# Patient Record
Sex: FEMALE | Race: OTHER | HISPANIC OR LATINO
[De-identification: names, ages, dates, MRNs, and addresses within clinical notes are randomized per-mention and may not be internally consistent; named-entity substitution may affect disease eponyms.]

---

## 2023-04-14 PROBLEM — Z00.00 ENCOUNTER FOR PREVENTIVE HEALTH EXAMINATION: Status: ACTIVE | Noted: 2023-04-14

## 2023-04-21 ENCOUNTER — APPOINTMENT (OUTPATIENT)
Dept: NEUROLOGY | Facility: CLINIC | Age: 58
End: 2023-04-21
Payer: MEDICARE

## 2023-04-21 ENCOUNTER — LABORATORY RESULT (OUTPATIENT)
Age: 58
End: 2023-04-21

## 2023-04-21 VITALS
HEART RATE: 81 BPM | HEIGHT: 64 IN | OXYGEN SATURATION: 99 % | WEIGHT: 156 LBS | SYSTOLIC BLOOD PRESSURE: 105 MMHG | BODY MASS INDEX: 26.63 KG/M2 | DIASTOLIC BLOOD PRESSURE: 62 MMHG | TEMPERATURE: 98.3 F

## 2023-04-21 DIAGNOSIS — Z76.0 ENCOUNTER FOR ISSUE OF REPEAT PRESCRIPTION: ICD-10-CM

## 2023-04-21 DIAGNOSIS — Z86.69 PERSONAL HISTORY OF OTHER DISEASES OF THE NERVOUS SYSTEM AND SENSE ORGANS: ICD-10-CM

## 2023-04-21 DIAGNOSIS — M25.552 PAIN IN RIGHT HIP: ICD-10-CM

## 2023-04-21 DIAGNOSIS — Z86.79 PERSONAL HISTORY OF OTHER DISEASES OF THE CIRCULATORY SYSTEM: ICD-10-CM

## 2023-04-21 DIAGNOSIS — M25.551 PAIN IN RIGHT HIP: ICD-10-CM

## 2023-04-21 DIAGNOSIS — M25.559 PAIN IN UNSPECIFIED HIP: ICD-10-CM

## 2023-04-21 DIAGNOSIS — Z80.9 FAMILY HISTORY OF MALIGNANT NEOPLASM, UNSPECIFIED: ICD-10-CM

## 2023-04-21 DIAGNOSIS — Z86.39 PERSONAL HISTORY OF OTHER ENDOCRINE, NUTRITIONAL AND METABOLIC DISEASE: ICD-10-CM

## 2023-04-21 DIAGNOSIS — Z78.9 OTHER SPECIFIED HEALTH STATUS: ICD-10-CM

## 2023-04-21 PROCEDURE — 99205 OFFICE O/P NEW HI 60 MIN: CPT

## 2023-04-21 RX ORDER — METOPROLOL SUCCINATE 25 MG/1
25 TABLET, EXTENDED RELEASE ORAL
Refills: 0 | Status: ACTIVE | COMMUNITY

## 2023-04-21 RX ORDER — ATORVASTATIN CALCIUM 80 MG/1
80 TABLET, FILM COATED ORAL
Refills: 0 | Status: ACTIVE | COMMUNITY

## 2023-04-21 RX ORDER — ESCITALOPRAM OXALATE 10 MG/1
10 TABLET, FILM COATED ORAL
Refills: 0 | Status: ACTIVE | COMMUNITY

## 2023-04-21 RX ORDER — CRANBERRY CONC/C/BACILL COAG 250-30-15
TABLET ORAL
Refills: 0 | Status: ACTIVE | COMMUNITY

## 2023-04-21 RX ORDER — CYCLOBENZAPRINE HYDROCHLORIDE 7.5 MG/1
TABLET, FILM COATED ORAL
Refills: 0 | Status: ACTIVE | COMMUNITY

## 2023-04-21 RX ORDER — ESCITALOPRAM OXALATE 20 MG/1
TABLET ORAL
Refills: 0 | Status: ACTIVE | COMMUNITY

## 2023-04-21 RX ORDER — ESOMEPRAZOLE MAGNESIUM 40 MG/1
40 CAPSULE, DELAYED RELEASE ORAL
Refills: 0 | Status: ACTIVE | COMMUNITY

## 2023-04-21 RX ORDER — ASPIRIN 81 MG/1
81 TABLET ORAL
Refills: 0 | Status: ACTIVE | COMMUNITY

## 2023-04-21 RX ORDER — METFORMIN HYDROCHLORIDE 500 MG/1
500 TABLET, COATED ORAL
Refills: 0 | Status: ACTIVE | COMMUNITY

## 2023-04-21 NOTE — ASSESSMENT
[FreeTextEntry1] : Memory problems and headaches in patient with FEROZ and severe anxiety both can be causal factors, will get reversible causes of dementia and MRI brain to eval for treatable causes, advised patient to fu with sleep specialist and psychiatrist.\par \par I spent the time noted on the day of this patient encounter preparing for, providing and documenting the above E/M service and counseling and educate patient on differential, workup, disease course, and treatment/management. Education was provided to the patient during this encounter. All questions and concerns were answered and addressed in detail. The patient verbalized understanding and agreed to plan. Patient was advised to continue to monitor for neurologic symptoms and to notify my office or go to the nearest emergency room if there are any changes. Any orders/referrals and communications were provided as well. \par Side effects of the above medications were discussed in detail including but not limited to applicable black box warning and teratogenicity as appropriate. \par Patient was advised to bring previous records to my office, including CD of imaging, when applicable. \par \par

## 2023-04-21 NOTE — CONSULT LETTER
[Dear  ___] : Dear  [unfilled], [Consult Letter:] : I had the pleasure of evaluating your patient, [unfilled]. [Please see my note below.] : Please see my note below. [Consult Closing:] : Thank you very much for allowing me to participate in the care of this patient.  If you have any questions, please do not hesitate to contact me. [Sincerely,] : Sincerely, [FreeTextEntry3] : Marcie Alvarez MD, MPH\par

## 2023-04-21 NOTE — PHYSICAL EXAM
[Person] : oriented to person [Place] : oriented to place [Time] : oriented to time [Concentration Intact] : normal concentrating ability [Naming Objects] : no difficulty naming common objects [Fluency] : fluency intact [Comprehension] : comprehension intact [Vocabulary] : adequate range of vocabulary [Cranial Nerves Optic (II)] : visual acuity intact bilaterally,  visual fields full to confrontation, pupils equal round and reactive to light [Cranial Nerves Oculomotor (III)] : extraocular motion intact [Cranial Nerves Trigeminal (V)] : facial sensation intact symmetrically [Cranial Nerves Facial (VII)] : face symmetrical [Cranial Nerves Vestibulocochlear (VIII)] : hearing was intact bilaterally [Cranial Nerves Glossopharyngeal (IX)] : tongue and palate midline [Cranial Nerves Accessory (XI - Cranial And Spinal)] : head turning and shoulder shrug symmetric [Cranial Nerves Hypoglossal (XII)] : there was no tongue deviation with protrusion [Motor Tone] : muscle tone was normal in all four extremities [Motor Strength] : muscle strength was normal in all four extremities [Involuntary Movements] : no involuntary movements were seen [Sensation Tactile Decrease] : light touch was intact [Abnormal Walk] : normal gait [Balance] : balance was intact [Coordination - Dysmetria Impaired Finger-to-Nose Bilateral] : not present [Coordination - Dysmetria Impaired Heel-to-Shin Bilateral] : not present

## 2023-05-09 LAB
FOLATE SERPL-MCNC: 9.9 NG/ML
METHYLMALONATE SERPL-SCNC: 108 NMOL/L
T PALLIDUM AB SER QL IA: POSITIVE
T3 SERPL-MCNC: 115 NG/DL
T4 SERPL-MCNC: 6.6 UG/DL
TSH SERPL-ACNC: 0.12 UIU/ML
VIT B12 SERPL-MCNC: 554 PG/ML

## 2023-07-21 ENCOUNTER — APPOINTMENT (OUTPATIENT)
Dept: NEUROLOGY | Facility: CLINIC | Age: 58
End: 2023-07-21
Payer: MEDICARE

## 2023-07-21 VITALS
TEMPERATURE: 97.5 F | DIASTOLIC BLOOD PRESSURE: 57 MMHG | HEART RATE: 87 BPM | OXYGEN SATURATION: 98 % | SYSTOLIC BLOOD PRESSURE: 93 MMHG

## 2023-07-21 DIAGNOSIS — F41.9 ANXIETY DISORDER, UNSPECIFIED: ICD-10-CM

## 2023-07-21 DIAGNOSIS — R41.3 OTHER AMNESIA: ICD-10-CM

## 2023-07-21 PROCEDURE — 99215 OFFICE O/P EST HI 40 MIN: CPT

## 2023-07-21 NOTE — HISTORY OF PRESENT ILLNESS
[FreeTextEntry1] : The patient has FEROZ and has chronic headaches as well as lapses of short term memory which recovers with time. She has not been seen by sleep specialist for long time. Also has severe anxiety, sees sleep specialist. \par \par No change of memory since last visit.

## 2023-07-21 NOTE — ASSESSMENT
[FreeTextEntry1] : Memory problems and headaches in patient with FEROZ and severe anxiety both can be causal factors, labs for reversible causes of dementia show TSH low, 0.12 with normal T4 and Treponema Ab reactive and Trep +christopher and MRI brain to eval for treatable causes, advised patient to fu with sleep specialist and psychiatrist as well as ID a and endocrine\par \par Advised patient that  her sex partner needs to be tested and treated for syphilis.\par \par fu after imaging\par \par I spent the time noted on the day of this patient encounter preparing for, providing and documenting the above E/M service and counseling and educate patient on differential, workup, disease course, and treatment/management. Education was provided to the patient during this encounter. All questions and concerns were answered and addressed in detail. The patient verbalized understanding and agreed to plan. Patient was advised to continue to monitor for neurologic symptoms and to notify my office or go to the nearest emergency room if there are any changes. Any orders/referrals and communications were provided as well. \par Side effects of the above medications were discussed in detail including but not limited to applicable black box warning and teratogenicity as appropriate. \par Patient was advised to bring previous records to my office, including CD of imaging, when applicable. \par \par

## 2023-07-21 NOTE — DATA REVIEWED
[de-identified] : TSH low, 0.12 with normal T4\par Treponema Ab reactive and Trep +christopher\par b12/folate, MMA normal

## 2023-08-03 ENCOUNTER — APPOINTMENT (OUTPATIENT)
Dept: INFECTIOUS DISEASE | Facility: CLINIC | Age: 58
End: 2023-08-03
Payer: MEDICARE

## 2023-08-03 VITALS
HEART RATE: 79 BPM | HEIGHT: 64 IN | WEIGHT: 154 LBS | SYSTOLIC BLOOD PRESSURE: 116 MMHG | BODY MASS INDEX: 26.29 KG/M2 | TEMPERATURE: 97.7 F | OXYGEN SATURATION: 97 % | DIASTOLIC BLOOD PRESSURE: 77 MMHG

## 2023-08-03 PROCEDURE — 99204 OFFICE O/P NEW MOD 45 MIN: CPT

## 2023-08-03 RX ORDER — ALBUTEROL SULFATE 90 UG/1
108 (90 BASE) INHALANT RESPIRATORY (INHALATION)
Refills: 0 | Status: ACTIVE | COMMUNITY

## 2023-08-03 NOTE — PHYSICAL EXAM
[General Appearance - Alert] : alert [General Appearance - In No Acute Distress] : in no acute distress [Sclera] : the sclera and conjunctiva were normal [PERRL With Normal Accommodation] : pupils were equal in size, round, reactive to light [Extraocular Movements] : extraocular movements were intact [Outer Ear] : the ears and nose were normal in appearance [Oropharynx] : the oropharynx was normal with no thrush [Neck Appearance] : the appearance of the neck was normal [Neck Cervical Mass (___cm)] : no neck mass was observed [Jugular Venous Distention Increased] : there was no jugular-venous distention [Thyroid Diffuse Enlargement] : the thyroid was not enlarged [Auscultation Breath Sounds / Voice Sounds] : lungs were clear to auscultation bilaterally [Heart Rate And Rhythm] : heart rate was normal and rhythm regular [Heart Sounds] : normal S1 and S2 [Heart Sounds Gallop] : no gallops [Murmurs] : no murmurs [Heart Sounds Pericardial Friction Rub] : no pericardial rub [Edema] : there was no peripheral edema [Bowel Sounds] : normal bowel sounds [Abdomen Soft] : soft [Abdomen Tenderness] : non-tender [] : no hepato-splenomegaly [Abdomen Mass (___ Cm)] : no abdominal mass palpated [Costovertebral Angle Tenderness] : no CVA tenderness [No Palpable Adenopathy] : no palpable adenopathy [Musculoskeletal - Swelling] : no joint swelling [Nail Clubbing] : no clubbing  or cyanosis of the fingernails [Motor Tone] : muscle strength and tone were normal [Deep Tendon Reflexes (DTR)] : deep tendon reflexes were 2+ and symmetric [No Focal Deficits] : no focal deficits [Sensation] : the sensory exam was normal to light touch and pinprick [Oriented To Time, Place, And Person] : oriented to person, place, and time [Affect] : the affect was normal

## 2023-08-08 NOTE — ASSESSMENT
[FreeTextEntry1] : 57F with headaches presenting with positive syphilis test The confirmatory was positive which reflexed to RPR which was nonreactive and <1:1 and the secondary confirmatory was positive as well.  She denies ever being treated for syphilis. Would check with [facility] if there are records of ever being tested. The only way of diagnosing neurosyphilis is through a lumbar puncture, looking at the protein and glucose levels as well VDRL   Plan:  contact [facility] syphilis directory.  discuss with neurology to arrange LP and sending protein, glucose, cell count, VDRL treatment for neurosyphilis requires IV penicillin for 10-14 days  treatment for late latent syphilis would usually involve 3 injection of IM Bicillin but due to long term drug shortage, the treatment at this time is doxycycline 100mg BID for 28 days  need more info prior to deciding what kind of treatment she will need   RTC in 1 month

## 2023-08-08 NOTE — HISTORY OF PRESENT ILLNESS
[FreeTextEntry1] : [employer] [id number], Jose E   57F with FEROZ and headaches presents to ID office for a positive syphilis test sent by her neurologist.  Patient denies ever being treated syphilis.  Never had STD  Never had any lesions or ulcer in genital area Goes to GYN yearly, known to have fibroids One pregnancy only, no abortions or miscarriages, 28 years old , doesn't recall testing for syphilis  #sexual partners: 2, father of her daughter and her current partner   Social: No smoking  no etoh no drugs

## 2023-08-08 NOTE — REASON FOR VISIT
[Other: ______] : provided by MANJU [Initial Evaluation] : an initial evaluation [Interpreters_IDNumber] : [id number] [Interpreters_FullName] : Jose E [TWNoteComboBox1] : Dominican

## 2023-09-07 ENCOUNTER — APPOINTMENT (OUTPATIENT)
Dept: INFECTIOUS DISEASE | Facility: CLINIC | Age: 58
End: 2023-09-07
Payer: MEDICARE

## 2023-09-07 VITALS
OXYGEN SATURATION: 98 % | TEMPERATURE: 97.6 F | HEIGHT: 64 IN | BODY MASS INDEX: 26.29 KG/M2 | DIASTOLIC BLOOD PRESSURE: 71 MMHG | HEART RATE: 80 BPM | WEIGHT: 154 LBS | SYSTOLIC BLOOD PRESSURE: 109 MMHG

## 2023-09-07 DIAGNOSIS — M54.9 DORSALGIA, UNSPECIFIED: ICD-10-CM

## 2023-09-07 DIAGNOSIS — G89.29 DORSALGIA, UNSPECIFIED: ICD-10-CM

## 2023-09-07 DIAGNOSIS — R68.89 OTHER GENERAL SYMPTOMS AND SIGNS: ICD-10-CM

## 2023-09-07 PROCEDURE — 99213 OFFICE O/P EST LOW 20 MIN: CPT

## 2023-09-07 NOTE — REVIEW OF SYSTEMS
[Chest Pain] : no chest pain [Palpitations] : palpitations [Abdominal Pain] : abdominal pain [Vomiting] : no vomiting [Constipation] : no constipation [Diarrhea] : no diarrhea [Negative] : Heme/Lymph

## 2023-09-07 NOTE — HISTORY OF PRESENT ILLNESS
[FreeTextEntry1] : Ms Marquez is here for follow up  She complains of headache and stomach pain, palpitations.  Back problems which cause pain in hip and neck

## 2023-09-07 NOTE — ASSESSMENT
[FreeTextEntry1] : 57F with forgetfulness, memory issues, give referrals for chronic hip and back pain.  will get JEROD info on syphilis. she should follow up with neurology.  if neurosyphilis is not going to be entertained and she has no hx of treatment would start doxy 100mg BID for 28 days

## 2023-09-07 NOTE — REASON FOR VISIT
[Follow-Up: _____] : a [unfilled] follow-up visit [[employer] ] : provided by [employer]   [Interpreters_IDNumber] : [id number] [Interpreters_FullName] : Ramin [TWNoteComboBox1] : Sudanese

## 2023-09-07 NOTE — PHYSICAL EXAM
[General Appearance - Alert] : alert [General Appearance - In No Acute Distress] : in no acute distress [Sclera] : the sclera and conjunctiva were normal [PERRL With Normal Accommodation] : pupils were equal in size, round, reactive to light [Extraocular Movements] : extraocular movements were intact [Neck Appearance] : the appearance of the neck was normal [Neck Cervical Mass (___cm)] : no neck mass was observed [Jugular Venous Distention Increased] : there was no jugular-venous distention [Thyroid Diffuse Enlargement] : the thyroid was not enlarged [] : no respiratory distress [Auscultation Breath Sounds / Voice Sounds] : lungs were clear to auscultation bilaterally [Heart Rate And Rhythm] : heart rate was normal and rhythm regular [Heart Sounds] : normal S1 and S2 [Heart Sounds Gallop] : no gallops [Murmurs] : no murmurs [Heart Sounds Pericardial Friction Rub] : no pericardial rub [Edema] : there was no peripheral edema [Musculoskeletal - Swelling] : no joint swelling [Nail Clubbing] : no clubbing  or cyanosis of the fingernails [Motor Tone] : muscle strength and tone were normal [Sensation] : the sensory exam was normal to light touch and pinprick [No Focal Deficits] : no focal deficits [Oriented To Time, Place, And Person] : oriented to person, place, and time [Affect] : the affect was normal

## 2023-09-12 LAB
BASOPHILS # BLD AUTO: 0.03 K/UL
BASOPHILS NFR BLD AUTO: 0.5 %
EOSINOPHIL # BLD AUTO: 0.03 K/UL
EOSINOPHIL NFR BLD AUTO: 0.5 %
HCT VFR BLD CALC: 37.4 %
HGB BLD-MCNC: 12.2 G/DL
IMM GRANULOCYTES NFR BLD AUTO: 0.2 %
LYMPHOCYTES # BLD AUTO: 1.79 K/UL
LYMPHOCYTES NFR BLD AUTO: 28 %
MAN DIFF?: NORMAL
MCHC RBC-ENTMCNC: 31.9 PG
MCHC RBC-ENTMCNC: 32.6 GM/DL
MCV RBC AUTO: 97.9 FL
MONOCYTES # BLD AUTO: 0.39 K/UL
MONOCYTES NFR BLD AUTO: 6.1 %
NEUTROPHILS # BLD AUTO: 4.15 K/UL
NEUTROPHILS NFR BLD AUTO: 64.7 %
PLATELET # BLD AUTO: 174 K/UL
RBC # BLD: 3.82 M/UL
RBC # FLD: 12.5 %
WBC # FLD AUTO: 6.4 K/UL

## 2023-11-03 ENCOUNTER — EMERGENCY (EMERGENCY)
Facility: HOSPITAL | Age: 58
LOS: 1 days | Discharge: HOME | End: 2023-11-03
Attending: EMERGENCY MEDICINE
Payer: SELF-PAY

## 2023-11-03 VITALS
WEIGHT: 149.91 LBS | RESPIRATION RATE: 20 BRPM | SYSTOLIC BLOOD PRESSURE: 119 MMHG | HEIGHT: 64 IN | OXYGEN SATURATION: 98 % | TEMPERATURE: 98 F | HEART RATE: 81 BPM | DIASTOLIC BLOOD PRESSURE: 65 MMHG

## 2023-11-03 PROCEDURE — 99285 EMERGENCY DEPT VISIT HI MDM: CPT

## 2023-11-03 PROCEDURE — 73030 X-RAY EXAM OF SHOULDER: CPT | Mod: 26,LT

## 2023-11-03 PROCEDURE — 70450 CT HEAD/BRAIN W/O DYE: CPT | Mod: 26,MA

## 2023-11-03 PROCEDURE — 99284 EMERGENCY DEPT VISIT MOD MDM: CPT | Mod: 25

## 2023-11-03 PROCEDURE — 72125 CT NECK SPINE W/O DYE: CPT | Mod: 26,MA

## 2023-11-03 PROCEDURE — 71046 X-RAY EXAM CHEST 2 VIEWS: CPT

## 2023-11-03 PROCEDURE — 70450 CT HEAD/BRAIN W/O DYE: CPT | Mod: MA

## 2023-11-03 PROCEDURE — 73564 X-RAY EXAM KNEE 4 OR MORE: CPT | Mod: 26,RT

## 2023-11-03 PROCEDURE — 73564 X-RAY EXAM KNEE 4 OR MORE: CPT

## 2023-11-03 PROCEDURE — 73030 X-RAY EXAM OF SHOULDER: CPT

## 2023-11-03 PROCEDURE — 72125 CT NECK SPINE W/O DYE: CPT | Mod: MA

## 2023-11-03 PROCEDURE — 71046 X-RAY EXAM CHEST 2 VIEWS: CPT | Mod: 26

## 2023-11-03 RX ORDER — IBUPROFEN 200 MG
600 TABLET ORAL ONCE
Refills: 0 | Status: COMPLETED | OUTPATIENT
Start: 2023-11-03 | End: 2023-11-03

## 2023-11-03 RX ORDER — METHOCARBAMOL 500 MG/1
1500 TABLET, FILM COATED ORAL ONCE
Refills: 0 | Status: COMPLETED | OUTPATIENT
Start: 2023-11-03 | End: 2023-11-03

## 2023-11-03 RX ADMIN — METHOCARBAMOL 1500 MILLIGRAM(S): 500 TABLET, FILM COATED ORAL at 18:27

## 2023-11-03 RX ADMIN — Medication 600 MILLIGRAM(S): at 18:27

## 2023-11-03 NOTE — ED PROVIDER NOTE - PROGRESS NOTE DETAILS
Hagan: pt ambulating. no limp. xr neg. ct neg. dc. pt asking for hip xr but not necessary as pt is ambulating and no PE findings to suggest injury other than soft tissue

## 2023-11-03 NOTE — ED PROVIDER NOTE - NSFOLLOWUPINSTRUCTIONS_ED_ALL_ED_FT
please use heat packs to area 3x/day 20 mins each session, take motrin 600mg every 6 hrs as needed, tylenol 650mg every 4 hrs as needed, stay active, no heavy lifting and return if symptoms  worsens. see your MD for physical therapy. should last about 1 week.    use compresas térmicas en el área 3 veces al día dash 20 minutos en cada sesión, tome motrin 600 mg cada 6 horas según sea necesario, tylenol 650 mg cada 4 horas según sea necesario, manténgase activo, no levante objetos pesados ??y regrese si los síntomas empeoran. Consulte a liu médico para recibir fisioterapia. debería durar aproximadamente 1 semana.
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
## 2023-11-03 NOTE — ED PROVIDER NOTE - MUSCULOSKELETAL, MLM
Spine appears normal, range of motion is not limited, ttp at right knee and left shoulder. pt ambulating with limp on right

## 2023-11-03 NOTE — ED PROVIDER NOTE - PATIENT PORTAL LINK FT
You can access the FollowMyHealth Patient Portal offered by [facility] by registering at the following website: [email or web address]. By joining [employer]’s FollowMyHealth portal, you will also be able to view your health information using other applications (apps) compatible with our system. You can access the FollowMyHealth Patient Portal offered by [facility] by registering at the following website: [email or web address]. By joining [employer]’s FollowMyHealth portal, you will also be able to view your health information using other applications (apps) compatible with our system. You can access the FollowMyHealth Patient Portal offered by [facility] by registering at the following website: [email or web address]. By joining [employer]’s FollowMyHealth portal, you will also be able to view your health information using other applications (apps) compatible with our system.
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
## 2023-11-03 NOTE — ED ADULT NURSE NOTE - NSFALLUNIVINTERV_ED_ALL_ED
Bed/Stretcher in lowest position, wheels locked, appropriate side rails in place/Call bell, personal items and telephone in reach/Instruct patient to call for assistance before getting out of bed/chair/stretcher/Non-slip footwear applied when patient is off stretcher/[place] to call system/Physically safe environment - no spills, clutter or unnecessary equipment/Purposeful proactive rounding/Room/bathroom lighting operational, light cord in reach Bed/Stretcher in lowest position, wheels locked, appropriate side rails in place/Call bell, personal items and telephone in reach/Instruct patient to call for assistance before getting out of bed/chair/stretcher/Non-slip footwear applied when patient is off stretcher/[place] to call system/Physically safe environment - no spills, clutter or unnecessary equipment/Purposeful proactive rounding/Room/bathroom lighting operational, light cord in reach Bed/Stretcher in lowest position, wheels locked, appropriate side rails in place/Call bell, personal items and telephone in reach/Instruct patient to call for assistance before getting out of bed/chair/stretcher/Non-slip footwear applied when patient is off stretcher/[place] to call system/Physically safe environment - no spills, clutter or unnecessary equipment/Purposeful proactive rounding/Room/bathroom lighting operational, light cord in reach

## 2023-12-06 ENCOUNTER — APPOINTMENT (OUTPATIENT)
Dept: ENDOCRINOLOGY | Facility: CLINIC | Age: 58
End: 2023-12-06
Payer: MEDICARE

## 2023-12-06 VITALS
RESPIRATION RATE: 16 BRPM | HEIGHT: 64 IN | OXYGEN SATURATION: 98 % | DIASTOLIC BLOOD PRESSURE: 69 MMHG | TEMPERATURE: 97.6 F | BODY MASS INDEX: 27.31 KG/M2 | HEART RATE: 95 BPM | SYSTOLIC BLOOD PRESSURE: 105 MMHG | WEIGHT: 160 LBS

## 2023-12-06 DIAGNOSIS — R79.89 OTHER SPECIFIED ABNORMAL FINDINGS OF BLOOD CHEMISTRY: ICD-10-CM

## 2023-12-06 PROCEDURE — 99205 OFFICE O/P NEW HI 60 MIN: CPT

## 2023-12-07 ENCOUNTER — NON-APPOINTMENT (OUTPATIENT)
Age: 58
End: 2023-12-07

## 2023-12-07 ENCOUNTER — APPOINTMENT (OUTPATIENT)
Dept: INFECTIOUS DISEASE | Facility: CLINIC | Age: 58
End: 2023-12-07
Payer: MEDICARE

## 2023-12-07 VITALS
HEIGHT: 64 IN | TEMPERATURE: 97.4 F | SYSTOLIC BLOOD PRESSURE: 127 MMHG | BODY MASS INDEX: 26.12 KG/M2 | OXYGEN SATURATION: 98 % | WEIGHT: 153 LBS | DIASTOLIC BLOOD PRESSURE: 80 MMHG | HEART RATE: 69 BPM

## 2023-12-07 DIAGNOSIS — A53.9 SYPHILIS, UNSPECIFIED: ICD-10-CM

## 2023-12-07 DIAGNOSIS — R41.3 OTHER AMNESIA: ICD-10-CM

## 2023-12-07 PROCEDURE — 99212 OFFICE O/P EST SF 10 MIN: CPT

## 2023-12-10 PROBLEM — R79.89 ABNORMAL THYROID BLOOD TEST: Status: ACTIVE | Noted: 2023-07-21

## 2023-12-11 ENCOUNTER — APPOINTMENT (OUTPATIENT)
Dept: RADIOLOGY | Facility: CLINIC | Age: 58
End: 2023-12-11
Payer: MEDICARE

## 2023-12-11 ENCOUNTER — APPOINTMENT (OUTPATIENT)
Dept: PULMONOLOGY | Facility: CLINIC | Age: 58
End: 2023-12-11
Payer: MEDICARE

## 2023-12-11 DIAGNOSIS — R93.89 ABNORMAL FINDINGS ON DIAGNOSTIC IMAGING OF OTHER SPECIFIED BODY STRUCTURES: ICD-10-CM

## 2023-12-11 DIAGNOSIS — R06.2 WHEEZING: ICD-10-CM

## 2023-12-11 DIAGNOSIS — G47.33 OBSTRUCTIVE SLEEP APNEA (ADULT) (PEDIATRIC): ICD-10-CM

## 2023-12-11 PROCEDURE — 99205 OFFICE O/P NEW HI 60 MIN: CPT

## 2023-12-11 PROCEDURE — 71046 X-RAY EXAM CHEST 2 VIEWS: CPT

## 2023-12-13 ENCOUNTER — APPOINTMENT (OUTPATIENT)
Dept: RADIOLOGY | Facility: HOSPITAL | Age: 58
End: 2023-12-13

## 2023-12-13 ENCOUNTER — OUTPATIENT (OUTPATIENT)
Dept: OUTPATIENT SERVICES | Facility: HOSPITAL | Age: 58
LOS: 1 days | End: 2023-12-13
Payer: COMMERCIAL

## 2023-12-13 ENCOUNTER — RESULT REVIEW (OUTPATIENT)
Age: 58
End: 2023-12-13

## 2023-12-13 DIAGNOSIS — A53.0 LATENT SYPHILIS, UNSPECIFIED AS EARLY OR LATE: ICD-10-CM

## 2023-12-13 LAB
APPEARANCE CSF: CLEAR — SIGNIFICANT CHANGE UP
APPEARANCE CSF: CLEAR — SIGNIFICANT CHANGE UP
COLOR CSF: SIGNIFICANT CHANGE UP
COLOR CSF: SIGNIFICANT CHANGE UP
GLUCOSE CSF-MCNC: 81 MG/DL — HIGH (ref 40–70)
GRAM STN FLD: SIGNIFICANT CHANGE UP
GRAM STN FLD: SIGNIFICANT CHANGE UP
NEUTROPHILS # CSF: SIGNIFICANT CHANGE UP (ref 0–6)
NEUTROPHILS # CSF: SIGNIFICANT CHANGE UP (ref 0–6)
NRBC NFR CSF: <1 /UL — SIGNIFICANT CHANGE UP (ref 0–5)
NRBC NFR CSF: <1 /UL — SIGNIFICANT CHANGE UP (ref 0–5)
PROT CSF-MCNC: 22 MG/DL — SIGNIFICANT CHANGE UP (ref 15–45)
RBC # CSF: 0 /UL — SIGNIFICANT CHANGE UP (ref 0–0)
RBC # CSF: 0 /UL — SIGNIFICANT CHANGE UP (ref 0–0)
SPECIMEN SOURCE: SIGNIFICANT CHANGE UP
SPECIMEN SOURCE: SIGNIFICANT CHANGE UP
TUBE TYPE: SIGNIFICANT CHANGE UP
TUBE TYPE: SIGNIFICANT CHANGE UP

## 2023-12-13 PROCEDURE — 62328 DX LMBR SPI PNXR W/FLUOR/CT: CPT

## 2023-12-13 PROCEDURE — 87205 SMEAR GRAM STAIN: CPT

## 2023-12-13 PROCEDURE — 86592 SYPHILIS TEST NON-TREP QUAL: CPT

## 2023-12-13 PROCEDURE — 82945 GLUCOSE OTHER FLUID: CPT

## 2023-12-13 PROCEDURE — 87070 CULTURE OTHR SPECIMN AEROBIC: CPT

## 2023-12-13 PROCEDURE — 89051 BODY FLUID CELL COUNT: CPT

## 2023-12-13 PROCEDURE — 84157 ASSAY OF PROTEIN OTHER: CPT

## 2023-12-15 ENCOUNTER — APPOINTMENT (OUTPATIENT)
Dept: ENDOCRINOLOGY | Facility: CLINIC | Age: 58
End: 2023-12-15
Payer: MEDICARE

## 2023-12-15 DIAGNOSIS — R76.8 OTHER SPECIFIED ABNORMAL IMMUNOLOGICAL FINDINGS IN SERUM: ICD-10-CM

## 2023-12-15 LAB
ALBUMIN SERPL ELPH-MCNC: 4.7 G/DL
ALP BLD-CCNC: 108 U/L
ALT SERPL-CCNC: 14 U/L
ANION GAP SERPL CALC-SCNC: 12 MMOL/L
AST SERPL-CCNC: 19 U/L
BILIRUB SERPL-MCNC: 0.7 MG/DL
BUN SERPL-MCNC: 8 MG/DL
CALCIUM SERPL-MCNC: 9.4 MG/DL
CHLORIDE SERPL-SCNC: 103 MMOL/L
CO2 SERPL-SCNC: 28 MMOL/L
CREAT SERPL-MCNC: 0.59 MG/DL
EGFR: 103 ML/MIN/1.73M2
GLUCOSE SERPL-MCNC: 104 MG/DL
POTASSIUM SERPL-SCNC: 3.9 MMOL/L
PROT SERPL-MCNC: 6.9 G/DL
SODIUM SERPL-SCNC: 143 MMOL/L
T3 SERPL-MCNC: 103 NG/DL
T4 FREE SERPL-MCNC: 1.2 NG/DL
THYROPEROXIDASE AB SERPL IA-ACNC: 117 IU/ML
TSH RECEPTOR AB: 2.58 IU/L
TSH SERPL-ACNC: 0.83 UIU/ML
TSI ACT/NOR SER: 0.32 IU/L

## 2023-12-15 PROCEDURE — 99442: CPT

## 2023-12-18 LAB
VDRL CSF-TITR: SIGNIFICANT CHANGE UP
VDRL CSF-TITR: SIGNIFICANT CHANGE UP

## 2023-12-20 LAB
CULTURE RESULTS: NO GROWTH — SIGNIFICANT CHANGE UP
CULTURE RESULTS: NO GROWTH — SIGNIFICANT CHANGE UP
SPECIMEN SOURCE: SIGNIFICANT CHANGE UP
SPECIMEN SOURCE: SIGNIFICANT CHANGE UP

## 2024-01-04 RX ORDER — DOXYCYCLINE 100 MG/1
100 TABLET, FILM COATED ORAL TWICE DAILY
Qty: 56 | Refills: 0 | Status: ACTIVE | COMMUNITY
Start: 2024-01-04 | End: 1900-01-01

## 2024-02-14 ENCOUNTER — APPOINTMENT (OUTPATIENT)
Dept: ENDOCRINOLOGY | Facility: CLINIC | Age: 59
End: 2024-02-14

## 2024-03-15 NOTE — HISTORY OF PRESENT ILLNESS
[FreeTextEntry1] : 58F positive syphilis and not sure if ever treated had memory issues in order to dx neurosyphilis one needs a LP  doing well today and asymptomatic

## 2024-03-15 NOTE — PHYSICAL EXAM
[General Appearance - Alert] : alert [General Appearance - In No Acute Distress] : in no acute distress [Sclera] : the sclera and conjunctiva were normal [Extraocular Movements] : extraocular movements were intact [PERRL With Normal Accommodation] : pupils were equal in size, round, reactive to light [] : no rash [Sensation] : the sensory exam was normal to light touch and pinprick [Affect] : the affect was normal [No Focal Deficits] : no focal deficits [Oriented To Time, Place, And Person] : oriented to person, place, and time

## 2024-03-15 NOTE — ASSESSMENT
[FreeTextEntry1] : 58F with a positive syphilis test sent to us from neurologist  memory is pretty good  in order to rule in or out neurosyphilis will do LP  will do labs IR lumbar puncture and send csf for cell count, glucose, protein, vdrl, culture

## 2024-06-27 ENCOUNTER — RX RENEWAL (OUTPATIENT)
Age: 59
End: 2024-06-27

## 2024-07-18 ENCOUNTER — RX RENEWAL (OUTPATIENT)
Age: 59
End: 2024-07-18

## 2025-03-22 ENCOUNTER — EMERGENCY (EMERGENCY)
Facility: HOSPITAL | Age: 60
LOS: 1 days | Discharge: HOME | End: 2025-03-22
Attending: EMERGENCY MEDICINE
Payer: COMMERCIAL

## 2025-03-22 VITALS
SYSTOLIC BLOOD PRESSURE: 184 MMHG | WEIGHT: 149.91 LBS | OXYGEN SATURATION: 99 % | HEART RATE: 82 BPM | TEMPERATURE: 98 F | HEIGHT: 64 IN | DIASTOLIC BLOOD PRESSURE: 95 MMHG | RESPIRATION RATE: 19 BRPM

## 2025-03-22 PROCEDURE — 73110 X-RAY EXAM OF WRIST: CPT

## 2025-03-22 PROCEDURE — 99283 EMERGENCY DEPT VISIT LOW MDM: CPT | Mod: 25

## 2025-03-22 PROCEDURE — 25600 CLTX DST RDL FX/EPHYS SEP WO: CPT | Mod: LT

## 2025-03-22 PROCEDURE — 73110 X-RAY EXAM OF WRIST: CPT | Mod: 26,RT

## 2025-03-22 PROCEDURE — 99284 EMERGENCY DEPT VISIT MOD MDM: CPT | Mod: 57

## 2025-03-22 PROCEDURE — 25600 CLTX DST RDL FX/EPHYS SEP WO: CPT | Mod: 54,RT

## 2025-03-22 RX ORDER — IBUPROFEN 200 MG
1 TABLET ORAL
Qty: 28 | Refills: 0
Start: 2025-03-22 | End: 2025-03-28

## 2025-03-22 RX ORDER — ACETAMINOPHEN 500 MG/5ML
650 LIQUID (ML) ORAL ONCE
Refills: 0 | Status: COMPLETED | OUTPATIENT
Start: 2025-03-22 | End: 2025-03-22

## 2025-03-22 RX ORDER — ACETAMINOPHEN 500 MG/5ML
2 LIQUID (ML) ORAL
Qty: 56 | Refills: 0
Start: 2025-03-22 | End: 2025-03-28

## 2025-03-22 RX ORDER — IBUPROFEN 200 MG
600 TABLET ORAL ONCE
Refills: 0 | Status: COMPLETED | OUTPATIENT
Start: 2025-03-22 | End: 2025-03-22

## 2025-03-22 RX ADMIN — Medication 650 MILLIGRAM(S): at 22:47

## 2025-03-22 RX ADMIN — Medication 600 MILLIGRAM(S): at 22:47

## 2025-03-22 NOTE — ED ADULT TRIAGE NOTE - CHIEF COMPLAINT QUOTE
Pt returned from DR today with a cast to Rt arm   c/o cast too tight , noticed Rt hand swollen and bruised , pain

## 2025-03-22 NOTE — ED PROVIDER NOTE - NSFOLLOWUPCLINICS_GEN_ALL_ED_FT
[facility]  Orthopedics  [address] [place] [address]  Phone: [phone]  Fax: [phone]

## 2025-03-22 NOTE — ED ADULT TRIAGE NOTE - TEMP AT ED ARRIVAL (C)
36.4

## 2025-03-22 NOTE — ED PROVIDER NOTE - PROGRESS NOTE DETAILS
cast was removed.  A sugar-tong splint was placed on the right upper extremity.  X-ray shows a distal radius fracture.  Will have the patient follow-up with orthopedics.  Return precautions discussed.

## 2025-03-22 NOTE — ED PROVIDER NOTE - PHYSICAL EXAMINATION
Cast to right upper extremity with swelling, ecchymoses on distal aspect of hand and fingers.  Patient able to have range of motion of fingers.

## 2025-03-22 NOTE — ED PROVIDER NOTE - OBJECTIVE STATEMENT
59-year-old female denies past medical history presents for pain to right upper extremity. 59-year-old female denies past medical history presents for pain to right upper extremity.[place] she [place] and went to the hospital and was told that she has a fracture of her wrist.  Patient was placed in a cast at that time and put in a sling.  Patient states this happened 2 days ago.  Patient states she flew back from the [place] today but was having severe swelling in the right hand and arm so states that it was causing a lot of pain so came to the ED for evaluation.

## 2025-03-22 NOTE — ED PROVIDER NOTE - CLINICAL SUMMARY MEDICAL DECISION MAKING FREE TEXT BOX
59-year-old female presents for a tight cast to right upper extremity.  Pe as above.    Cast removal, splint placement, x-ray, pain control, reassess

## 2025-03-22 NOTE — ED PROVIDER NOTE - PATIENT PORTAL LINK FT
You can access the FollowMyHealth Patient Portal offered by [facility] by registering at the following website: [email or web address]. By joining [employer]’s FollowMyHealth portal, you will also be able to view your health information using other applications (apps) compatible with our system.

## 2025-03-22 NOTE — ED PROVIDER NOTE - NSPTACCESSSVCSAPPTDETAILS_ED_ALL_ED_FT
urgent.  The patient has a fracture to her right wrist and needs to follow-up with orthopedics for evaluation

## 2025-03-24 ENCOUNTER — APPOINTMENT (OUTPATIENT)
Age: 60
End: 2025-03-24
Payer: MEDICARE

## 2025-03-24 VITALS
SYSTOLIC BLOOD PRESSURE: 139 MMHG | HEIGHT: 64 IN | HEART RATE: 99 BPM | WEIGHT: 148 LBS | BODY MASS INDEX: 25.27 KG/M2 | OXYGEN SATURATION: 97 % | DIASTOLIC BLOOD PRESSURE: 69 MMHG

## 2025-03-24 DIAGNOSIS — S52.531A COLLES' FRACTURE OF RIGHT RADIUS, INITIAL ENCOUNTER FOR CLOSED FRACTURE: ICD-10-CM

## 2025-03-24 DIAGNOSIS — S52.611A DISPLACED FRACTURE OF RIGHT ULNA STYLOID PROCESS, INITIAL ENCOUNTER FOR CLOSED FRACTURE: ICD-10-CM

## 2025-03-24 PROCEDURE — 99204 OFFICE O/P NEW MOD 45 MIN: CPT

## 2025-03-31 ENCOUNTER — APPOINTMENT (OUTPATIENT)
Age: 60
End: 2025-03-31

## 2025-03-31 VITALS
DIASTOLIC BLOOD PRESSURE: 69 MMHG | HEIGHT: 64 IN | SYSTOLIC BLOOD PRESSURE: 113 MMHG | WEIGHT: 143 LBS | HEART RATE: 85 BPM | OXYGEN SATURATION: 98 % | BODY MASS INDEX: 24.41 KG/M2

## 2025-03-31 PROCEDURE — 29075 APPL CST ELBW FNGR SHORT ARM: CPT | Mod: RT

## 2025-03-31 PROCEDURE — 73110 X-RAY EXAM OF WRIST: CPT | Mod: RT

## 2025-03-31 PROCEDURE — 99213 OFFICE O/P EST LOW 20 MIN: CPT | Mod: 25

## 2025-04-10 ENCOUNTER — APPOINTMENT (OUTPATIENT)
Dept: ENDOCRINOLOGY | Facility: CLINIC | Age: 60
End: 2025-04-10

## 2025-04-15 ENCOUNTER — APPOINTMENT (OUTPATIENT)
Age: 60
End: 2025-04-15
Payer: MEDICARE

## 2025-04-15 DIAGNOSIS — M54.2 CERVICALGIA: ICD-10-CM

## 2025-04-15 DIAGNOSIS — M54.50 LOW BACK PAIN, UNSPECIFIED: ICD-10-CM

## 2025-04-15 PROCEDURE — 99214 OFFICE O/P EST MOD 30 MIN: CPT

## 2025-04-15 PROCEDURE — 99204 OFFICE O/P NEW MOD 45 MIN: CPT

## 2025-04-15 RX ORDER — TIZANIDINE 2 MG/1
2 TABLET ORAL EVERY 6 HOURS
Qty: 56 | Refills: 1 | Status: ACTIVE | COMMUNITY
Start: 2025-04-15 | End: 1900-01-01

## 2025-04-28 ENCOUNTER — APPOINTMENT (OUTPATIENT)
Age: 60
End: 2025-04-28

## 2025-04-28 PROCEDURE — 73110 X-RAY EXAM OF WRIST: CPT | Mod: RT

## 2025-04-28 PROCEDURE — 99213 OFFICE O/P EST LOW 20 MIN: CPT

## 2025-04-29 ENCOUNTER — APPOINTMENT (OUTPATIENT)
Age: 60
End: 2025-04-29

## 2025-06-16 ENCOUNTER — APPOINTMENT (OUTPATIENT)
Age: 60
End: 2025-06-16

## 2025-06-24 ENCOUNTER — RX RENEWAL (OUTPATIENT)
Age: 60
End: 2025-06-24